# Patient Record
Sex: FEMALE | Race: WHITE | NOT HISPANIC OR LATINO
[De-identification: names, ages, dates, MRNs, and addresses within clinical notes are randomized per-mention and may not be internally consistent; named-entity substitution may affect disease eponyms.]

---

## 2023-01-23 PROBLEM — Z00.00 ENCOUNTER FOR PREVENTIVE HEALTH EXAMINATION: Status: ACTIVE | Noted: 2023-01-23

## 2023-01-25 ENCOUNTER — APPOINTMENT (OUTPATIENT)
Dept: ORTHOPEDIC SURGERY | Facility: CLINIC | Age: 63
End: 2023-01-25
Payer: SELF-PAY

## 2023-01-25 DIAGNOSIS — Z78.9 OTHER SPECIFIED HEALTH STATUS: ICD-10-CM

## 2023-01-25 DIAGNOSIS — M17.12 UNILATERAL PRIMARY OSTEOARTHRITIS, LEFT KNEE: ICD-10-CM

## 2023-01-25 PROCEDURE — 99203 OFFICE O/P NEW LOW 30 MIN: CPT

## 2023-01-25 PROCEDURE — 73562 X-RAY EXAM OF KNEE 3: CPT | Mod: LT

## 2023-01-25 PROCEDURE — 20610 DRAIN/INJ JOINT/BURSA W/O US: CPT

## 2023-01-25 NOTE — PHYSICAL EXAM
[de-identified] : Examination of the left knee is as follows:\par INSPECTION: mild effusion, but no ecchymosis, no erythema, no atrophy, no deformities of quad tendon or of patellar tendon\par PALPATION: medial and lateral joint line tenderness, tenderness to palpation over medial and lateral patellar facets, but no palpable mass, no obvious defects, no increased warmth, no calf tenderness\par ROM: flexion 125 degrees, but extension 0 degrees\par STRENGTH: quadriceps 5/5, hamstring 5/5\par TESTING: ligamentously stable\par NEURO: light touch is intact throughout\par GAIT: antalgic, but patient ambulates without assistive device\par \par X-rays of the left knee is as follows: \par Knee 3 view in the anteroposterior, lateral, skyline views: moderate knee oa with medial joint space narrowing and moderate to severe patellofemoral djd

## 2023-01-25 NOTE — DATA REVIEWED
[MRI] : MRI [Left] : left [Knee] : knee [I independently reviewed and interpreted images and report] : I independently reviewed and interpreted images and report [FreeTextEntry1] : Mara; IMPRESSION:\par \par \par Severe patellofemoral compartment osteoarthritis.\par \par Mild medial compartment osteoarthritis.\par \par Moderate-sized joint effusion with diffuse synovial proliferation.

## 2023-01-25 NOTE — ASSESSMENT
[FreeTextEntry1] : 61 yo female presenting with severe left patellofemoral djd and moderate medial joint space djd. \par -Discussed with patient that in the future she will be indicated for left TKA, patient understands\par -Discussed with patient that left knee aspiration is not recommended as effusion is not large and fluid will reaccumulate\par -Discussed with patient risks, benefits, alternatives of left knee intraarticular csi, patient is not interested in injection at this time\par -Activities as tolerated\par -Rest, ice, compression, elevation, NSAIDs PRN for pain. Discussed with patient that taking OTC NSAIDs long term can effect her kidneys and that its not recommended taking long term for arthritis. Patient expressed understanding.\par -All questions answered\par -F/u PRN

## 2023-01-25 NOTE — PROCEDURE
[FreeTextEntry3] : Large joint injection was performed of the left knee. The indication for this procedure was pain, inflammation and x-ray evidence of Osteoarthritis on this or prior visit. The site was prepped with alcohol. An injection of Betamethasone (Celestone) 1cc of 3mg, Lidocaine 7cc of 1%  was used. Patient tolerated procedure well. Patient was advised to call if redness, pain or fever occur and apply ice for 15 minutes out of every hour for the next 12-24 hours as tolerated. Patient has tried OTC's including aspirin, Ibuprofen, Aleve, etc or prescription NSAIDS, and/or exercises at home and/or physical therapy without satisfactory response, patient had decreased mobility in the joint and the risks benefits, and alternatives have been discussed, and verbal consent was obtained.

## 2023-01-25 NOTE — HISTORY OF PRESENT ILLNESS
[Sudden] : sudden [7] : 7 [Dull/Aching] : dull/aching [Sharp] : sharp [Shooting] : shooting [Stabbing] : stabbing [Throbbing] : throbbing [Constant] : constant [Rest] : rest [Full time] : Work status: full time [de-identified] : Patient is here for her left knee. Patient had MRI at [facility] on 1/19/2023. Patient states NKI. Patient states pain started 2 weeks ago. Patient states her knee feels hot to the touch. Patient states she has sharp, dull, stabbing and throbbing that is constant at rest or weight bearing. Patient states her knee has given out on her. \par \par \par \par IMPRESSION:\par \par \par Severe patellofemoral compartment osteoarthritis.\par \par Mild medial compartment osteoarthritis.\par \par Moderate-sized joint effusion with diffuse synovial proliferation. [] : Post Surgical Visit: no [FreeTextEntry1] : Left knee [FreeTextEntry3] : 2 weeks ago  [FreeTextEntry5] : Patient states NKI [de-identified] : Movement  [de-identified] : Zia